# Patient Record
Sex: MALE | URBAN - METROPOLITAN AREA
[De-identification: names, ages, dates, MRNs, and addresses within clinical notes are randomized per-mention and may not be internally consistent; named-entity substitution may affect disease eponyms.]

---

## 2019-05-07 ENCOUNTER — TELEPHONE (OUTPATIENT)
Dept: GASTROENTEROLOGY | Facility: CLINIC | Age: 54
End: 2019-05-07

## 2019-05-07 ENCOUNTER — TELEPHONE (OUTPATIENT)
Dept: FAMILY MEDICINE | Facility: CLINIC | Age: 54
End: 2019-05-07

## 2019-05-07 ENCOUNTER — OFFICE VISIT (OUTPATIENT)
Dept: FAMILY MEDICINE | Facility: CLINIC | Age: 54
End: 2019-05-07
Payer: COMMERCIAL

## 2019-05-07 VITALS
BODY MASS INDEX: 36.29 KG/M2 | SYSTOLIC BLOOD PRESSURE: 127 MMHG | OXYGEN SATURATION: 96 % | HEIGHT: 69 IN | HEART RATE: 80 BPM | DIASTOLIC BLOOD PRESSURE: 83 MMHG | WEIGHT: 245 LBS | TEMPERATURE: 98.8 F

## 2019-05-07 DIAGNOSIS — Z11.59 NEED FOR HEPATITIS C SCREENING TEST: ICD-10-CM

## 2019-05-07 DIAGNOSIS — K40.90 UNILATERAL INGUINAL HERNIA WITHOUT OBSTRUCTION OR GANGRENE, RECURRENCE NOT SPECIFIED: ICD-10-CM

## 2019-05-07 DIAGNOSIS — R63.4 UNINTENDED WEIGHT LOSS: ICD-10-CM

## 2019-05-07 DIAGNOSIS — R68.81 EARLY SATIETY: ICD-10-CM

## 2019-05-07 DIAGNOSIS — Z11.4 SCREENING FOR HIV (HUMAN IMMUNODEFICIENCY VIRUS): ICD-10-CM

## 2019-05-07 DIAGNOSIS — K59.00 CONSTIPATION, UNSPECIFIED CONSTIPATION TYPE: ICD-10-CM

## 2019-05-07 DIAGNOSIS — Z12.11 SCREEN FOR COLON CANCER: ICD-10-CM

## 2019-05-07 DIAGNOSIS — K42.9 UMBILICAL HERNIA WITHOUT OBSTRUCTION AND WITHOUT GANGRENE: Primary | ICD-10-CM

## 2019-05-07 LAB
ERYTHROCYTE [DISTWIDTH] IN BLOOD BY AUTOMATED COUNT: 13.7 % (ref 10–15)
HCT VFR BLD AUTO: 48.1 % (ref 40–53)
HGB BLD-MCNC: 16.1 G/DL (ref 13.3–17.7)
MCH RBC QN AUTO: 32 PG (ref 26.5–33)
MCHC RBC AUTO-ENTMCNC: 33.5 G/DL (ref 31.5–36.5)
MCV RBC AUTO: 96 FL (ref 78–100)
PLATELET # BLD AUTO: 205 10E9/L (ref 150–450)
RBC # BLD AUTO: 5.03 10E12/L (ref 4.4–5.9)
WBC # BLD AUTO: 7.3 10E9/L (ref 4–11)

## 2019-05-07 PROCEDURE — 80053 COMPREHEN METABOLIC PANEL: CPT | Performed by: NURSE PRACTITIONER

## 2019-05-07 PROCEDURE — 86803 HEPATITIS C AB TEST: CPT | Performed by: NURSE PRACTITIONER

## 2019-05-07 PROCEDURE — 87389 HIV-1 AG W/HIV-1&-2 AB AG IA: CPT | Performed by: NURSE PRACTITIONER

## 2019-05-07 PROCEDURE — 99203 OFFICE O/P NEW LOW 30 MIN: CPT | Performed by: NURSE PRACTITIONER

## 2019-05-07 PROCEDURE — 36415 COLL VENOUS BLD VENIPUNCTURE: CPT | Performed by: NURSE PRACTITIONER

## 2019-05-07 PROCEDURE — 85027 COMPLETE CBC AUTOMATED: CPT | Performed by: NURSE PRACTITIONER

## 2019-05-07 RX ORDER — POLYETHYLENE GLYCOL 3350 17 G/17G
1 POWDER, FOR SOLUTION ORAL 2 TIMES DAILY PRN
Qty: 578 G | Refills: 0 | Status: SHIPPED | OUTPATIENT
Start: 2019-05-07 | End: 2019-08-01

## 2019-05-07 ASSESSMENT — PAIN SCALES - GENERAL: PAINLEVEL: MILD PAIN (3)

## 2019-05-07 ASSESSMENT — MIFFLIN-ST. JEOR: SCORE: 1896.69

## 2019-05-07 NOTE — LETTER
Mariela 3, 2019    Arian Riaz Jaime  [address] [address]    Dear Arian    We care about your health and have reviewed your health plan. We have reviewed your medical conditions, medication list, and lab results and are making recommendations based on this review, to better manage your health.    You are in particular need of attention regarding:  - Scheduling your Colonoscopy, after reviewing your chart you are due for a colonoscopy please call us at [phone] to help with scheduling if you need.      Here is a list of Health Maintenance topics that are due now or due soon:  Health Maintenance Due   Topic Date Due     PREVENTIVE CARE VISIT  1966     COLONOSCOPY  03/15/1976     DTAP/TDAP/TD IMMUNIZATION (1 - Tdap) 03/15/1991     LIPID  03/15/2001     ZOSTER IMMUNIZATION (1 of 2) 03/15/2016     We will be calling you in the next couple of weeks to help you schedule any appointments that are needed.  Please call us at [phone] (or use [employer]) to address the above recommendations.     Thank you for trusting [facility] and we appreciate the opportunity to serve you.  We look forward to supporting your healthcare needs in the future.    Healthy Regards,    Coco Coughlin, DARÍO/cm

## 2019-05-07 NOTE — LETTER
[facility]  [address] [place]  [address]  [phone]        May 9, 2019    Arian Jaime  [address] [address]        Dear Arian,    The results of your recent labs are enclosed.   Your blood work was within normal range. Glucose was slightly elevated but not a true fasting glucose. We can check an A1c at your follow up appointment to rule out diabetes though I think it is unlikely.   HIV and hepatitis C screening was negative.   Please call the clinic if you have any concerns.     Results for orders placed or performed in visit on 05/07/19   Hepatitis C Screen Reflex to HCV RNA Quant and Genotype   Result Value Ref Range    Hepatitis C Antibody Nonreactive NR^Nonreactive   HIV Screening   Result Value Ref Range    HIV Antigen Antibody Combo Nonreactive NR^Nonreactive       Comprehensive metabolic panel (BMP + Alb, Alk Phos, ALT, AST, Total. Bili, TP)   Result Value Ref Range    Sodium 141 133 - 144 mmol/L    Potassium 4.0 3.4 - 5.3 mmol/L    Chloride 109 94 - 109 mmol/L    Carbon Dioxide 25 20 - 32 mmol/L    Anion Gap 7 3 - 14 mmol/L    Glucose 118 (H) 70 - 99 mg/dL    Urea Nitrogen 14 7 - 30 mg/dL    Creatinine 0.69 0.66 - 1.25 mg/dL    GFR Estimate >90 >60 mL/min/[1.73_m2]    GFR Estimate If Black >90 >60 mL/min/[1.73_m2]    Calcium 9.0 8.5 - 10.1 mg/dL    Bilirubin Total 0.8 0.2 - 1.3 mg/dL    Albumin 4.3 3.4 - 5.0 g/dL    Protein Total 7.6 6.8 - 8.8 g/dL    Alkaline Phosphatase 73 40 - 150 U/L    ALT 20 0 - 70 U/L    AST 12 0 - 45 U/L   CBC with platelets   Result Value Ref Range    WBC 7.3 4.0 - 11.0 10e9/L    RBC Count 5.03 4.4 - 5.9 10e12/L    Hemoglobin 16.1 13.3 - 17.7 g/dL    Hematocrit 48.1 40.0 - 53.0 %    MCV 96 78 - 100 fl    MCH 32.0 26.5 - 33.0 pg    MCHC 33.5 31.5 - 36.5 g/dL    RDW 13.7 10.0 - 15.0 %    Platelet Count 205 150 - 450 10e9/L       If you have any questions please call the clinic at  [phone].    Sincerely,    Coco AMARO CNP  LMD

## 2019-05-07 NOTE — PROGRESS NOTES
"  SUBJECTIVE:   Arian Jaime is a 64 year old male who presents to clinic today for the following   health issues:      2 hernias  1 in belly button   Left inguinal really painful  Had for months but was doing some heavy lifting last month and feels like they're worse    16 pound weight loss over past month  Feels like he can't eat  Feels bloated and \"tight pressure\" in abdomen so he has been eating less  Some nausea  Denies vomiting  Constipation for 3 weeks  Taking laxatives (bisacodyl 10 mg) daily otherwise will have BM every 3-4 days  No black or bloody stools  Never had colonoscopy  Constipation is a new issue. Never had previously        Additional history: as documented    Reviewed  and updated as needed this visit by clinical staff  Tobacco  Allergies  Meds  Med Hx  Surg Hx  Fam Hx  Soc Hx        Reviewed and updated as needed this visit by Provider         There is no problem list on file for this patient.    History reviewed. No pertinent surgical history.    Social History     Tobacco Use     Smoking status: Current Some Day Smoker     Smokeless tobacco: Never Used   Substance Use Topics     Alcohol use: Yes     Comment: occ.     Family History   Problem Relation Age of Onset     No Known Problems Mother            ROS:  Constitutional, HEENT, cardiovascular, pulmonary, gi and gu systems are negative, except as otherwise noted.    OBJECTIVE:     /83 (BP Location: Right arm, Patient Position: Chair, Cuff Size: Adult Regular)   Pulse 80   Temp 98.8  F (37.1  C) (Oral)   Ht 1.753 m (5' 9\")   Wt 111.1 kg (245 lb)   SpO2 96%   BMI 36.18 kg/m    Body mass index is 36.18 kg/m .  GENERAL: healthy, alert and no distress  RESP: lungs clear to auscultation - no rales, rhonchi or wheezes  CV: regular rate and rhythm, normal S1 S2, no S3 or S4, no murmur, click or rub, no peripheral edema and peripheral pulses strong  ABDOMEN: soft, non tender, bowel sounds active  Umbilical hernia tender to " palpation, not protruding from umbilicus   (male): Large reproducible tender left inguinal hernia    Diagnostic Test Results:  none     ASSESSMENT/PLAN:       ICD-10-CM    1. Umbilical hernia without obstruction and without gangrene K42.9 GENERAL SURG ADULT REFERRAL     CT Abdomen Pelvis w Contrast   2. Unilateral inguinal hernia without obstruction or gangrene, recurrence not specified K40.90 GENERAL SURG ADULT REFERRAL     CT Abdomen Pelvis w Contrast   3. Unintended weight loss R63.4 GENERAL SURG ADULT REFERRAL     Comprehensive metabolic panel (BMP + Alb, Alk Phos, ALT, AST, Total. Bili, TP)     CBC with platelets     CT Abdomen Pelvis w Contrast   4. Screen for colon cancer Z12.11 GASTROENTEROLOGY ADULT REF PROCEDURE ONLY [facility] [phone]   5. Need for hepatitis C screening test Z11.59 Hepatitis C Screen Reflex to HCV RNA Quant and Genotype   6. Constipation, unspecified constipation type K59.00 polyethylene glycol (MIRALAX/GLYCOLAX) powder     CT Abdomen Pelvis w Contrast   7. Screening for HIV (human immunodeficiency virus) Z11.4 HIV Screening   8. Early satiety R68.81 CT Abdomen Pelvis w Contrast       Concerning to have 16 pound weight loss and acute change in bowel  Hernia does not appear to be incarcerated and he is able to evacuate his bowels with daily laxative  Our TC helped to schedule general surgery consult and unfortunately the soonest he can be seen is May 22  Will obtain CT abdomen and pelvis tomorrow given sudden unintended weight loss and constipation  Abdomen is soft and bowel sounds active. I do not believe he has a bowel obstruction  He is also scheduled for colonoscopy which he is overdue for screening purposes and it this time warrants given sudden change in bowel habits    More than 25 minutes spent with patient, more than 50% of which was spent on counseling and coordination of care.      PREM Lubin [facility]

## 2019-05-07 NOTE — LETTER
June 20, 2019    Arina Jaime  [address] [address]      Dear Arian Jaime,     We have tried to contact you about your health, but have been unable to reach you.  Please call us as soon as possible so we can provide you with the best care possible.  We will continue to check in with you throughout the year to complete these items of care, if you are not able to complete these items at this time.  If you would like to complete the missing items for your care, please contact us at [phone].    We recommend the following:  -schedule a COLONOSCOPY to look for colon cancer (due every 10 years or 5 years in higher risk situations.)   Colon cancer is now the second leading cause of death in the [place] for both men and women and there are over 130,000 new cases and 50,000 deaths per year from colon cancer.  Colonoscopies can prevent 90-95% of these deaths.  Problem lesions can be removed before they ever become cancer.  This test is not only looking for cancer, but also getting rid of precancerious lesions.  If you do not wish to do a colonoscopy or cannot afford to do one, at this time, there is another option. It is called a FIT test.        Sincerely,     Your Care Team at [facility]

## 2019-05-07 NOTE — LETTER
[address] [address]  Phone: [phone]  Fax: [phone]    May 7, 2019        Arian Jaime  [address] [address]          To whom it may concern:    RE: Arian Jaime    Patient was seen and treated today at our clinic. Please excuse his absence from work today. He will need to miss work on the following days for further medical care:  5/8/19, 5/21/19, 5/23/19      Please contact me for questions or concerns.      Sincerely,        PREM Lubin CNP

## 2019-05-07 NOTE — TELEPHONE ENCOUNTER
Referring Provider: Coco Coughlin APRN CNP    BMI-36.18    Are you on daily home oxygen? n    Have you had a heart or lung transplant? n      In the past year, have you had any heart related issues  Including cardiomyopathy or a MI within 6 months, that required cardiac stenting or other implantable devices? n    What type of implantable device do you have? n    Do you take nitroglycerin? If yes, how often? n    Are you currently taking any blood thinners?n      (Females) Are you currently pregnant? n  If yes, how many weeks?      Have you had a procedure in the past that was difficult to tolerate with conscious sedation? Any allergies to Fentanyl or Versed n        Do you currently use any of the following?    Are you taking any scheduled prescription narcotics more than once daily? n    Drink alcohol daily? n    Currently using any street drugs or methadone?n    Do you have any history of post-traumatic stress syndrome or mental health issues? n

## 2019-05-08 ENCOUNTER — ANCILLARY PROCEDURE (OUTPATIENT)
Dept: CT IMAGING | Facility: CLINIC | Age: 54
End: 2019-05-08
Attending: NURSE PRACTITIONER
Payer: COMMERCIAL

## 2019-05-08 DIAGNOSIS — R63.4 UNINTENDED WEIGHT LOSS: ICD-10-CM

## 2019-05-08 DIAGNOSIS — R68.81 EARLY SATIETY: ICD-10-CM

## 2019-05-08 DIAGNOSIS — K40.90 UNILATERAL INGUINAL HERNIA WITHOUT OBSTRUCTION OR GANGRENE, RECURRENCE NOT SPECIFIED: ICD-10-CM

## 2019-05-08 DIAGNOSIS — K42.9 UMBILICAL HERNIA WITHOUT OBSTRUCTION AND WITHOUT GANGRENE: ICD-10-CM

## 2019-05-08 DIAGNOSIS — K59.00 CONSTIPATION, UNSPECIFIED CONSTIPATION TYPE: ICD-10-CM

## 2019-05-08 LAB
ALBUMIN SERPL-MCNC: 4.3 G/DL (ref 3.4–5)
ALP SERPL-CCNC: 73 U/L (ref 40–150)
ALT SERPL W P-5'-P-CCNC: 20 U/L (ref 0–70)
ANION GAP SERPL CALCULATED.3IONS-SCNC: 7 MMOL/L (ref 3–14)
AST SERPL W P-5'-P-CCNC: 12 U/L (ref 0–45)
BILIRUB SERPL-MCNC: 0.8 MG/DL (ref 0.2–1.3)
BUN SERPL-MCNC: 14 MG/DL (ref 7–30)
CALCIUM SERPL-MCNC: 9 MG/DL (ref 8.5–10.1)
CHLORIDE SERPL-SCNC: 109 MMOL/L (ref 94–109)
CO2 SERPL-SCNC: 25 MMOL/L (ref 20–32)
CREAT SERPL-MCNC: 0.69 MG/DL (ref 0.66–1.25)
GFR SERPL CREATININE-BSD FRML MDRD: >90 ML/MIN/{1.73_M2}
GLUCOSE SERPL-MCNC: 118 MG/DL (ref 70–99)
HCV AB SERPL QL IA: NONREACTIVE
HIV 1+2 AB+HIV1 P24 AG SERPL QL IA: NONREACTIVE
POTASSIUM SERPL-SCNC: 4 MMOL/L (ref 3.4–5.3)
PROT SERPL-MCNC: 7.6 G/DL (ref 6.8–8.8)
SODIUM SERPL-SCNC: 141 MMOL/L (ref 133–144)

## 2019-05-08 RX ORDER — IOPAMIDOL 755 MG/ML
135 INJECTION, SOLUTION INTRAVASCULAR ONCE
Status: COMPLETED | OUTPATIENT
Start: 2019-05-08 | End: 2019-05-08

## 2019-05-08 RX ADMIN — IOPAMIDOL 135 ML: 755 INJECTION, SOLUTION INTRAVASCULAR at 15:07

## 2019-05-08 NOTE — DISCHARGE INSTRUCTIONS

## 2019-05-09 ENCOUNTER — TELEPHONE (OUTPATIENT)
Dept: FAMILY MEDICINE | Facility: CLINIC | Age: 54
End: 2019-05-09

## 2019-05-09 NOTE — RESULT ENCOUNTER NOTE
[address] [address]  Phone: [phone]  Fax: [phone]      05/09/19    Arian Jaime  [address] [address]      Dear Arian,    The results of your recent labs are enclosed.  Your blood work was within normal range. Glucose was slightly elevated but not a true fasting glucose. We can check an A1c at your follow up appointment to rule out diabetes though I think it is unlikely.   HIV and hepatitis C screening was negative.   Please call the clinic if you have any concerns.    Sincerely,    PREM Lubin CNP    Your [facility] Care Team

## 2019-05-09 NOTE — RESULT ENCOUNTER NOTE
Please let him know that CT did not show any concerning findings that would explain his recent weight loss and constipation. Please follow through with planned appointments with general surgery and colonoscopy. Follow up with me 1-2 weeks after colonoscopy to reassess symptoms and review results of colonoscopy.  CT did show a small adrenal mass which is likely benign. We will discuss that further at his follow up appointment and whether we want to do additional workup regarding adrenal mass

## 2019-05-09 NOTE — TELEPHONE ENCOUNTER
Received the following message from Coco AMARO CNP:    Please let him know that CT did not show any concerning findings that would explain his recent weight loss and constipation. Please follow through with planned appointments with general surgery and colonoscopy. Follow up with me 1-2 weeks after colonoscopy to reassess symptoms and review results of colonoscopy.  CT did show a small adrenal mass which is likely benign. We will discuss that further at his follow up appointment and whether we want to do additional workup regarding adrenal mass

## 2019-05-09 NOTE — TELEPHONE ENCOUNTER
I called patient and advised him of Coco's result note and plan.  Scheduled him for follow up one week after planned colonoscopy.    Patient verbalized understanding of and agreement with plan.    Isabella Lewis RN  [facility]

## 2019-05-16 ENCOUNTER — TELEPHONE (OUTPATIENT)
Dept: GASTROENTEROLOGY | Facility: OUTPATIENT CENTER | Age: 54
End: 2019-05-16

## 2019-06-03 NOTE — TELEPHONE ENCOUNTER
Called and left  for patient to call back regarding items due in HM.  LU Sebastian MA      Quality letter mailed to patient as a reminder of  items due.  LU Sebastian MA

## 2019-06-06 ENCOUNTER — TELEPHONE (OUTPATIENT)
Dept: FAMILY MEDICINE | Facility: CLINIC | Age: 54
End: 2019-06-06

## 2019-06-06 DIAGNOSIS — R63.4 WEIGHT LOSS: ICD-10-CM

## 2019-06-06 DIAGNOSIS — E27.8 ADRENAL MASS (H): Primary | ICD-10-CM

## 2019-06-06 NOTE — TELEPHONE ENCOUNTER
I spoke with patient  He reports bowel issues have resolved  He is not sure if he is losing weight still  Advised to schedule lab only appointment (see labs ordered)  Also recommend endocrinology consult to determine is mass if functioning or not. Referral placed and patient elects to call to schedule    He was at work when I spoke with him so I wrote letter detailing our conversation. Please mail

## 2019-06-06 NOTE — LETTER
[address] [address]  Phone: [phone]  Fax: [phone]    June 6, 2019        Arian Jaime  [address] [address]          Arian,    As we spoke about on the phone, I ordered some blood work to further assess the mass on your adrenal gland. We need to identify if this is a non-functioning or functioning mass. Please schedule a lab only appointment. This should be a morning lab so you can schedule it before you go to work. Please call our clinic to schedule.    Additionally, I would like for you to see an endocrinologist to follow up on this mass. If it is a functioning tumor, it needs to be identified and treated appropriately. Most masses are non-functioning but you will need further evaluation to be sure. Below are several clinic options, please call to schedule an appointment with an endocrinologist.     [facility]  [phone]    [facility] [phone]    [facility] [phone]     Endocrinology Clinic [facility] [phone]      Please contact me for questions or concerns.      Sincerely,        PREM Lubin CNP

## 2019-06-06 NOTE — TELEPHONE ENCOUNTER
I see patient cancelled his colonoscopy and follow up with me. Can you call and see if he can reschedule? I am concerned about his weight loss and change in bowel habits. His CT showed an adrenal mass which often ends of being benign but I would recommend some further testing. Please let me know what works for him

## 2019-06-06 NOTE — TELEPHONE ENCOUNTER
Attempt # 1  Called patient at home number.[phone]  Was call answered?  Yes, broke 4 ribs, fell, tripped a couple weeks ago, states was seen in the ER for this, cancelled colonoscopy and f/u due to the broken ribs. Nurse relayed the message from Coco, patient states cannot take time off work for awhile because he was just off for 2 weeks for the broken ribs, nurse advised patient to schedule the colonoscopy as soon as possible with a f/u with Coco one week later, Patient verbalized understanding and agreement with plan. States as soon as he has some time off built up will schedule appointments.         Ashley Marrero RN  [facility]

## 2019-06-20 NOTE — TELEPHONE ENCOUNTER
Called and left  for patient to call back regarding items due in HM.  LU Sebastian MA      Final quality letter mailed to patient as a reminder of  items due.  LU Sebastian MA

## 2019-08-01 ENCOUNTER — OFFICE VISIT (OUTPATIENT)
Dept: FAMILY MEDICINE | Facility: CLINIC | Age: 54
End: 2019-08-01
Payer: COMMERCIAL

## 2019-08-01 VITALS
SYSTOLIC BLOOD PRESSURE: 145 MMHG | HEART RATE: 92 BPM | OXYGEN SATURATION: 97 % | BODY MASS INDEX: 37.07 KG/M2 | WEIGHT: 251 LBS | TEMPERATURE: 98.5 F | DIASTOLIC BLOOD PRESSURE: 91 MMHG

## 2019-08-01 DIAGNOSIS — Z13.6 SCREENING FOR HEART DISEASE: ICD-10-CM

## 2019-08-01 DIAGNOSIS — Z13.6 SCREENING FOR HEART DISEASE: Primary | ICD-10-CM

## 2019-08-01 DIAGNOSIS — G58.8 OTHER MONONEUROPATHY: Primary | ICD-10-CM

## 2019-08-01 PROBLEM — F10.10 ALCOHOL CONSUMPTION BINGE DRINKING: Status: ACTIVE | Noted: 2019-05-22

## 2019-08-01 PROBLEM — S22.41XA MULTIPLE CLOSED FRACTURES OF RIBS OF RIGHT SIDE: Status: ACTIVE | Noted: 2019-08-01

## 2019-08-01 PROBLEM — R03.0 ELEVATED BLOOD PRESSURE, SITUATIONAL: Status: ACTIVE | Noted: 2019-05-22

## 2019-08-01 LAB
ALBUMIN SERPL-MCNC: 4 G/DL (ref 3.4–5)
ALP SERPL-CCNC: 93 U/L (ref 40–150)
ALT SERPL W P-5'-P-CCNC: 23 U/L (ref 0–70)
ANION GAP SERPL CALCULATED.3IONS-SCNC: 5 MMOL/L (ref 3–14)
AST SERPL W P-5'-P-CCNC: 15 U/L (ref 0–45)
BASOPHILS # BLD AUTO: 0 10E9/L (ref 0–0.2)
BASOPHILS NFR BLD AUTO: 0.1 %
BILIRUB SERPL-MCNC: 0.4 MG/DL (ref 0.2–1.3)
BUN SERPL-MCNC: 19 MG/DL (ref 7–30)
CALCIUM SERPL-MCNC: 8.9 MG/DL (ref 8.5–10.1)
CHLORIDE SERPL-SCNC: 110 MMOL/L (ref 94–109)
CO2 SERPL-SCNC: 25 MMOL/L (ref 20–32)
CREAT SERPL-MCNC: 0.55 MG/DL (ref 0.66–1.25)
DIFFERENTIAL METHOD BLD: NORMAL
EOSINOPHIL # BLD AUTO: 0.2 10E9/L (ref 0–0.7)
EOSINOPHIL NFR BLD AUTO: 2.9 %
ERYTHROCYTE [DISTWIDTH] IN BLOOD BY AUTOMATED COUNT: 14.5 % (ref 10–15)
GFR SERPL CREATININE-BSD FRML MDRD: >90 ML/MIN/{1.73_M2}
GLUCOSE SERPL-MCNC: 122 MG/DL (ref 70–99)
HCT VFR BLD AUTO: 45.8 % (ref 40–53)
HGB BLD-MCNC: 14.9 G/DL (ref 13.3–17.7)
LYMPHOCYTES # BLD AUTO: 1 10E9/L (ref 0.8–5.3)
LYMPHOCYTES NFR BLD AUTO: 13.6 %
MCH RBC QN AUTO: 31.4 PG (ref 26.5–33)
MCHC RBC AUTO-ENTMCNC: 32.5 G/DL (ref 31.5–36.5)
MCV RBC AUTO: 96 FL (ref 78–100)
MONOCYTES # BLD AUTO: 0.8 10E9/L (ref 0–1.3)
MONOCYTES NFR BLD AUTO: 11.1 %
NEUTROPHILS # BLD AUTO: 5.2 10E9/L (ref 1.6–8.3)
NEUTROPHILS NFR BLD AUTO: 72.3 %
PLATELET # BLD AUTO: 227 10E9/L (ref 150–450)
POTASSIUM SERPL-SCNC: 4.1 MMOL/L (ref 3.4–5.3)
PROT SERPL-MCNC: 7.3 G/DL (ref 6.8–8.8)
RBC # BLD AUTO: 4.75 10E12/L (ref 4.4–5.9)
SODIUM SERPL-SCNC: 140 MMOL/L (ref 133–144)
TSH SERPL DL<=0.005 MIU/L-ACNC: 0.59 MU/L (ref 0.4–4)
VIT B12 SERPL-MCNC: 302 PG/ML (ref 193–986)
WBC # BLD AUTO: 7.2 10E9/L (ref 4–11)

## 2019-08-01 PROCEDURE — 84443 ASSAY THYROID STIM HORMONE: CPT | Performed by: FAMILY MEDICINE

## 2019-08-01 PROCEDURE — 00000402 ZZHCL STATISTIC TOTAL PROTEIN: Performed by: FAMILY MEDICINE

## 2019-08-01 PROCEDURE — 82607 VITAMIN B-12: CPT | Performed by: FAMILY MEDICINE

## 2019-08-01 PROCEDURE — 99214 OFFICE O/P EST MOD 30 MIN: CPT | Performed by: FAMILY MEDICINE

## 2019-08-01 PROCEDURE — 36415 COLL VENOUS BLD VENIPUNCTURE: CPT | Performed by: FAMILY MEDICINE

## 2019-08-01 PROCEDURE — 85025 COMPLETE CBC W/AUTO DIFF WBC: CPT | Performed by: FAMILY MEDICINE

## 2019-08-01 PROCEDURE — 80053 COMPREHEN METABOLIC PANEL: CPT | Performed by: FAMILY MEDICINE

## 2019-08-01 PROCEDURE — 84165 PROTEIN E-PHORESIS SERUM: CPT | Performed by: FAMILY MEDICINE

## 2019-08-01 SDOH — HEALTH STABILITY: MENTAL HEALTH: HOW MANY STANDARD DRINKS CONTAINING ALCOHOL DO YOU HAVE ON A TYPICAL DAY?: 5 OR 6

## 2019-08-01 SDOH — HEALTH STABILITY: MENTAL HEALTH: HOW OFTEN DO YOU HAVE A DRINK CONTAINING ALCOHOL?: 4 OR MORE TIMES A WEEK

## 2019-08-01 ASSESSMENT — PAIN SCALES - GENERAL: PAINLEVEL: NO PAIN (0)

## 2019-08-01 NOTE — PROGRESS NOTES
Subjective     Arian Jaime is a 54 year old male who presents to clinic today for the following health issues:    HPI   Edema      Duration: Ongoing for a few years, worse within the last few weeks    Description (location/character/radiation): Swollen, aching, little bumps on the back of heels    Intensity:  severe    Accompanying signs and symptoms: Numbness    History (similar episodes/previous evaluation): None    Precipitating or alleviating factors: Being on feet makes it worse, walks all day at work    Therapies tried and outcome: Lorriee, no relief     Liseth Fabian MA    Patient is here today concerned about some symptoms that have been gradually developing over the last couple of years.  He notes some pain in the arch of the foot as well as the lateral aspect of the foot.  There is some associated numbness and tingling in these areas and now it is starting to radiate up the lateral calf and may be a little bit above the knee into the lateral thigh.  It seems to be more of paresthesia type feeling.  This is primarily on the right side and is concerned about some swelling in the ankles.  He works as a  so is on his feet all day and that tends to make things worse.  He has not seen a doctor for this previously.  He feels that there is some swelling in the ankle joints.  He denies any shortness of breath or PND.  No known history of diabetes.  He does consume alcohol on a regular basis.  Bowel and bladder function is normal.        Patient Active Problem List   Diagnosis     Alcohol consumption binge drinking     Diverticulitis of colon     Elevated blood pressure, situational     Multiple closed fractures of ribs of right side     Past Surgical History:   Procedure Laterality Date     COLECTOMY PARTIAL  2009    sigmoid colectomy due to acute diverticulitis without rupture     TONSILLECTOMY         Social History     Tobacco Use     Smoking status: Current Some Day Smoker     Smokeless  tobacco: Never Used     Tobacco comment: social smoker   Substance Use Topics     Alcohol use: Yes     Frequency: 4 or more times a week     Drinks per session: 5 or 6     Comment: occ.     Family History   Problem Relation Age of Onset     Other - See Comments Mother         unknown, parents  when pt was infant     Kidney Cancer Father         cause of death     Other - See Comments Son         no knowledge, gave up for adoption         No current outpatient medications on file.     No Known Allergies    Reviewed and updated as needed this visit by Provider  Tobacco  Meds  Problems  Med Hx  Surg Hx  Fam Hx  Soc Hx        Review of Systems   ROS COMP: Constitutional, HEENT, cardiovascular, pulmonary, gi and gu systems are negative, except as otherwise noted.      Objective    BP (!) 145/91 (BP Location: Left arm, Patient Position: Chair, Cuff Size: Adult Large)   Pulse 92   Temp 98.5  F (36.9  C) (Oral)   Wt 113.9 kg (251 lb)   SpO2 97%   BMI 37.07 kg/m    Body mass index is 37.07 kg/m .  Physical Exam   GENERAL: healthy, alert and no distress  EYES: Eyes grossly normal to inspection, PERRL and conjunctivae and sclerae normal  NECK: no adenopathy, no asymmetry, masses, or scars and thyroid normal to palpation  RESP: lungs clear to auscultation - no rales, rhonchi or wheezes  CV: regular rate and rhythm, normal S1 S2, no S3 or S4, no murmur, click or rub, no peripheral edema and peripheral pulses strong  ABDOMEN: soft, nontender, no hepatosplenomegaly, no masses and bowel sounds normal  MS: no gross musculoskeletal defects noted, no edema  MS: There is no pitting edema.  He may have slight effusions to the ankles bilaterally.  Rather significant bilateral pes planus is noted and also a little bit of bowlegged deformity at the knee and arthritic changes as well.  SKIN: no suspicious lesions or rashes  NEURO: Normal strength and tone, mentation intact and speech normal  NEURO: DTR's normal and  "symmetric 2+  PSYCH: mentation appears normal, affect normal/bright    Diagnostic Test Results:  Labs reviewed in Epic        Assessment & Plan     1. Other mononeuropathy  He appears to have some type of neuropathy.  Its present greater on the right than the left and I wonder if it might be some type of compressive neuropathy related to his gait.  I have asked him to see podiatry because he has very significant bilateral flat feet.  Laboratory studies were checked to evaluate for possible metabolic cause.  If these initial steps or not helpful may consider referral to neurology or imaging of his lower back.  - CBC with platelets and differential  - Comprehensive metabolic panel (BMP + Alb, Alk Phos, ALT, AST, Total. Bili, TP)  - TSH with free T4 reflex  - Vitamin B12  - Protein electrophoresis  - PODIATRY/FOOT & ANKLE SURGERY REFERRAL    2. Screening for heart disease  Not fasting today.       Tobacco Cessation:   reports that he has been smoking.  He has never used smokeless tobacco.  Tobacco Cessation Action Plan: Information offered: Patient not interested at this time      BMI:   Estimated body mass index is 37.07 kg/m  as calculated from the following:    Height as of 5/7/19: 1.753 m (5' 9\").    Weight as of this encounter: 113.9 kg (251 lb).   Weight management plan: Discussed healthy diet and exercise guidelines            Return in about 4 weeks (around 8/29/2019) for Routine Visit if not improving.    Raymon Conner MD  [facility]    "

## 2019-08-01 NOTE — LETTER
[facility]   [address] [place]  [address]  [phone]                                   August 12, 2019    Arian Jaime  [address] [place] [address]        Dear Arian,    Test results all look good except for the mild elevation in the blood sugar.  This was present when last checked about 3 months ago.  See the foot specialist and let me know if that does not help with the symptoms.    Results for orders placed or performed in visit on 08/01/19   CBC with platelets and differential   Result Value Ref Range    WBC 7.2 4.0 - 11.0 10e9/L    RBC Count 4.75 4.4 - 5.9 10e12/L    Hemoglobin 14.9 13.3 - 17.7 g/dL    Hematocrit 45.8 40.0 - 53.0 %    MCV 96 78 - 100 fl    MCH 31.4 26.5 - 33.0 pg    MCHC 32.5 31.5 - 36.5 g/dL    RDW 14.5 10.0 - 15.0 %    Platelet Count 227 150 - 450 10e9/L    % Neutrophils 72.3 %    % Lymphocytes 13.6 %    % Monocytes 11.1 %    % Eosinophils 2.9 %    % Basophils 0.1 %    Absolute Neutrophil 5.2 1.6 - 8.3 10e9/L    Absolute Lymphocytes 1.0 0.8 - 5.3 10e9/L    Absolute Monocytes 0.8 0.0 - 1.3 10e9/L    Absolute Eosinophils 0.2 0.0 - 0.7 10e9/L    Absolute Basophils 0.0 0.0 - 0.2 10e9/L    Diff Method Automated Method    Comprehensive metabolic panel (BMP + Alb, Alk Phos, ALT, AST, Total. Bili, TP)   Result Value Ref Range    Sodium 140 133 - 144 mmol/L    Potassium 4.1 3.4 - 5.3 mmol/L    Chloride 110 (H) 94 - 109 mmol/L    Carbon Dioxide 25 20 - 32 mmol/L    Anion Gap 5 3 - 14 mmol/L    Glucose 122 (H) 70 - 99 mg/dL    Urea Nitrogen 19 7 - 30 mg/dL    Creatinine 0.55 (L) 0.66 - 1.25 mg/dL    GFR Estimate >90 >60 mL/min/[1.73_m2]    GFR Estimate If Black >90 >60 mL/min/[1.73_m2]    Calcium 8.9 8.5 - 10.1 mg/dL    Bilirubin Total 0.4 0.2 - 1.3 mg/dL    Albumin 4.0 3.4 - 5.0 g/dL    Protein Total 7.3 6.8 - 8.8 g/dL    Alkaline Phosphatase 93 40 - 150 U/L    ALT 23 0 - 70 U/L    AST 15 0 - 45 U/L   TSH with free T4 reflex   Result  Value Ref Range    TSH 0.59 0.40 - 4.00 mU/L   Vitamin B12   Result Value Ref Range    Vitamin B12 302 193 - 986 pg/mL   Protein electrophoresis   Result Value Ref Range    Albumin Fraction 4.4 3.7 - 5.1 g/dL    Alpha 1 Fraction 0.3 0.2 - 0.4 g/dL    Alpha 2 Fraction 0.6 0.5 - 0.9 g/dL    Beta Fraction 0.9 0.6 - 1.0 g/dL    Gamma Fraction 0.9 0.7 - 1.6 g/dL    Monoclonal Peak 0.0 0.0 g/dL    ELP Interpretation:       Essentially normal electrophoretic pattern.  No monoclonal protein seen. Pathologic   significance requires clinical correlation.  Chiki Zepeda M.D., Ph.D., Pathologist.            If you have any questions please call the clinic at [phone]    Sincerely,    Raymon Conner MD  bmd

## 2019-08-02 LAB
ALBUMIN SERPL ELPH-MCNC: 4.4 G/DL (ref 3.7–5.1)
ALPHA1 GLOB SERPL ELPH-MCNC: 0.3 G/DL (ref 0.2–0.4)
ALPHA2 GLOB SERPL ELPH-MCNC: 0.6 G/DL (ref 0.5–0.9)
B-GLOBULIN SERPL ELPH-MCNC: 0.9 G/DL (ref 0.6–1)
GAMMA GLOB SERPL ELPH-MCNC: 0.9 G/DL (ref 0.7–1.6)
M PROTEIN SERPL ELPH-MCNC: 0 G/DL
PROT PATTERN SERPL ELPH-IMP: NORMAL

## 2019-08-16 ENCOUNTER — OFFICE VISIT (OUTPATIENT)
Dept: PODIATRY | Facility: CLINIC | Age: 54
End: 2019-08-16
Payer: COMMERCIAL

## 2019-08-16 ENCOUNTER — ANCILLARY PROCEDURE (OUTPATIENT)
Dept: GENERAL RADIOLOGY | Facility: CLINIC | Age: 54
End: 2019-08-16
Attending: PODIATRIST
Payer: COMMERCIAL

## 2019-08-16 VITALS
SYSTOLIC BLOOD PRESSURE: 141 MMHG | HEART RATE: 87 BPM | TEMPERATURE: 98.1 F | OXYGEN SATURATION: 97 % | DIASTOLIC BLOOD PRESSURE: 92 MMHG

## 2019-08-16 DIAGNOSIS — M79.671 PAIN IN BOTH FEET: ICD-10-CM

## 2019-08-16 DIAGNOSIS — M79.672 PAIN IN BOTH FEET: ICD-10-CM

## 2019-08-16 DIAGNOSIS — M79.671 PAIN IN BOTH FEET: Primary | ICD-10-CM

## 2019-08-16 DIAGNOSIS — R20.0 NUMBNESS IN FEET: ICD-10-CM

## 2019-08-16 DIAGNOSIS — M79.672 PAIN IN BOTH FEET: Primary | ICD-10-CM

## 2019-08-16 DIAGNOSIS — M21.6X1 PRONATION OF BOTH FEET: ICD-10-CM

## 2019-08-16 DIAGNOSIS — M21.6X2 PRONATION OF BOTH FEET: ICD-10-CM

## 2019-08-16 LAB
ERYTHROCYTE [SEDIMENTATION RATE] IN BLOOD BY WESTERGREN METHOD: 5 MM/H (ref 0–20)
URATE SERPL-MCNC: 4.5 MG/DL (ref 3.5–7.2)

## 2019-08-16 PROCEDURE — 99203 OFFICE O/P NEW LOW 30 MIN: CPT | Performed by: PODIATRIST

## 2019-08-16 PROCEDURE — 84550 ASSAY OF BLOOD/URIC ACID: CPT | Performed by: PODIATRIST

## 2019-08-16 PROCEDURE — 85652 RBC SED RATE AUTOMATED: CPT | Performed by: PODIATRIST

## 2019-08-16 PROCEDURE — 73630 X-RAY EXAM OF FOOT: CPT | Mod: RT

## 2019-08-16 PROCEDURE — 86038 ANTINUCLEAR ANTIBODIES: CPT | Performed by: PODIATRIST

## 2019-08-16 PROCEDURE — 86618 LYME DISEASE ANTIBODY: CPT | Performed by: PODIATRIST

## 2019-08-16 PROCEDURE — 86431 RHEUMATOID FACTOR QUANT: CPT | Performed by: PODIATRIST

## 2019-08-16 PROCEDURE — 73610 X-RAY EXAM OF ANKLE: CPT | Mod: LT

## 2019-08-16 PROCEDURE — 73630 X-RAY EXAM OF FOOT: CPT | Mod: LT

## 2019-08-16 PROCEDURE — 36415 COLL VENOUS BLD VENIPUNCTURE: CPT | Performed by: PODIATRIST

## 2019-08-16 NOTE — PROGRESS NOTES
S: Patient seen today in consult from Dr. Conner for left foot pain.   Points to sinus tarsi and cuboidleft of left foot.  Has had this for a year but is getting worse over the last month.  He works as a .  His pain is aggravated by activity and relieved by rest.  He also has numbness on both feet.  The right is much worse.  He states he goes up his right leg laterally.  He also says he has some pain in his hip and he points area of the sciatic nerve.  This is his biggest complaint in his right ankle.  He does consume alcohol on a regular basis.    ROS:  A 10-point review of systems was performed and is positive for that noted in the HPI and as seen above.  All other areas are negative.        No Known Allergies    No current outpatient medications on file.       Patient Active Problem List   Diagnosis     Alcohol consumption binge drinking     Diverticulitis of colon     Elevated blood pressure, situational     Multiple closed fractures of ribs of right side       Past Medical History:   Diagnosis Date     Multiple rib fractures     x 4 on right side, hospitalized over night       Past Surgical History:   Procedure Laterality Date     COLECTOMY PARTIAL  2009    sigmoid colectomy due to acute diverticulitis without rupture     TONSILLECTOMY         Family History   Problem Relation Age of Onset     Other - See Comments Mother         unknown, parents  when pt was infant     Kidney Cancer Father         cause of death     Other - See Comments Son         no knowledge, gave up for adoption       Social History     Tobacco Use     Smoking status: Current Some Day Smoker     Smokeless tobacco: Never Used     Tobacco comment: social smoker   Substance Use Topics     Alcohol use: Yes     Frequency: 4 or more times a week     Drinks per session: 5 or 6     Comment: occ.         Exam:    Vitals: BP (!) 141/92 (BP Location: Right arm, Patient Position: Sitting, Cuff Size: Adult Large)   Pulse 87   Temp  98.1  F (36.7  C) (Oral)   SpO2 97%   BMI: There is no height or weight on file to calculate BMI.  Height: Data Unavailable    Constitutional/ general:  Pt is in no apparent distress, appears well-nourished.  Cooperative with history and physical exam.     Psych:  The patient answered questions appropriately.  Normal affect.  Seems to have reasonable expectations, in terms of treatment.     Eyes:  Visual scanning/ tracking without deficit.     Ears:  Response to auditory stimuli is normal.  negative hearing aid devices.  Auricles in proper alignment.     Lymphatic:  Popliteal lymph nodes not enlarged.     Lungs:  Non labored breathing, non labored speech. No cough.  No audible wheezing. Even, quiet breathing.       Vascular:  positive pedal pulses bilaterally for both the DP and PT arteries.  CFT < 3 sec.  negative ankle edema.  positive pedal hair growth.    Neuro:  Alert and oriented x 3. Coordinated gait.  Light touch sensation is intact to the L4, L5, S1 distributions, but patient feels numbness with light touch more so on the right foot especially lateral.  No ankle clonus.  Babinski normal.  No evidence of neurological-based weakness, spasticity, or contracture in the lower extremities.      Derm: Normal texture and turgor.  No erythema, ecchymosis, or cyanosis.      Musculoskeletal:    Lower extremity muscle strength is normal.  Patient is ambulatory without an assistive device or brace.   Pronated arch with weightbearing.  No forefoot or rear foot deformities noted.  Normal ROM all fore foot and rearfoot joints.  No equinus.    No pain with stressing any muscle compartments.  Left lateral foot and ankle there is significant edema here.  There is pain from the lateral tarsometatarsal joint to the sinus tarsi.  There is slight erythema here.  There is no crepitus.  I can feel no obvious masses.  No calcaneal compression pain.    Radiographic Exam:  X-Ray Findings: Right x-rays unremarkable.  Left foot x-rays  show bossing on the dorsum of the talonavicular joint but no appreciable arthritis here.  No gross ankle arthritis or any other bone findings to explain all the swelling.    Vitamin B12 302   193 - 986 pg/mL Final 08/01/2019  2:36 PM 51     Glucose  122    A:    Left lateral ankle pain and edema  Bilateral pronation  Bilateral numbness with the right worse than the left    P:  X-rays taken today both feet and left ankle explained to patient that I see no obvious musculoskeletal cause of the swelling and pain in his left lateral ankle.  We will send to the lab today to rule out inflammatory arthritis and will call patient with results.  We also discussed additional imaging to evaluate for stress reaction or stress fracture.  It is possible he could have a severe case of sinus tarsi syndrome however this would be quite unusual.  I reviewed patient's recent labs.  explained that right numbness could be from radiculopathy especially with his pain in his hips.   Patient's alcohol abuse could also be contributing to his nerve pathology.  For further evaluation of this we discussed an EMG and referral to her neurologist.  He is in agreement with this.  We placed a referral and he will set up a time to have this done..  We will call the patient with the lab results and decide and how to proceed for his left foot.  Thank you for allowing me participate in the care of this patient.        Sylvester Silveira DPM, FACFAS

## 2019-08-16 NOTE — PATIENT INSTRUCTIONS
SMOKING CESSATION  What's in cigarette smoke? - Cigarette smoke contains over 4,000 chemicals. Nicotine is one of the main ingredients which is an insecticide/herbicide. It is poisonous to our nervous system, increases blood clotting risk, and decreases the body's defenses to fight off infection. Another chemical is Carbon Monoxide is an asphyxiating gas that permanently binds to blood cells and blocks the transport of oxygen. This leads to tissue death and decreases your metabolism. Tar is a chemical that coats your lungs and trachea which impairs new oxygen coming in and carbon dioxide getting out of your body.   How does smoking impact surgery? - Smoking is particularly hazardous with regards to surgery. Surgery puts stress on the body and a smoker's body is already under strain from these chemicals. Putting the two together, especially for an elective surgery, could be a recipe for disaster. Smoking before and after surgery increases your risk of heart problems, slow wound healing, delayed bone healing, blood clots, wound infection and anesthesia complications.   What are the benefits of quitting? - In 20 minutes your blood pressure will drop back down to normal. In 8 hours the carbon monoxide (a toxic gas) levels in your blood stream will drop by half, and oxygen levels will return to normal. In 48 hours your chance of having a heart attack will have decreased. All nicotine will have left your body. Your sense of taste and smell will return to a normal level. In 72 hours your bronchial tubes will relax, and your energy levels will increase. In 2 weeks your circulation will increase, and it will continue to improve for the next 10 weeks.    Recommendations for elective surgery - Ideally, patients should quit smoking 8 weeks before and at least 2 weeks after elective surgery in order to avoid complications. Simply cutting back on the amount of cigarettes smoked per day does not offer any benefit or decrease the  risk of poor wound healing, heart problems, and infection. Smokers should also start taking Vitamin C and B for two weeks before surgery and two weeks after surgery.    Ways to Stop Smokin. Nicotine patches, lozenges, or gum  2. Support Groups  3. Medications (see below)    List of Medications:  1. Varenicline Tartrate (CHANTIX)   2. Bupropion HCL (WELLBUTRIN, ZYBAN) - note: make sure Wellbutrin is for smoking cessation and not other issues   3. Nicotine Patch (NICODERM)   4. Nicotine Inhaler (NICOTROL)   5. Nicotine Gum Nicotine Polacrilex   6. Nicotine Lozenge: Nicotine Polacrilex (COMMIT)   * [facility] offers a smoking support group as well!  Please visit: [email or web address]  If you are interested in these, ask about getting a prescription or talk to your primary care doctor about what may be the best way for you to quit.       Weight management plan: Patient was referred to their PCP to discuss a diet and exercise plan.     Arian to follow up with Primary Care provider regarding elevated blood pressure.

## 2019-08-16 NOTE — LETTER
8/16/2019         RE: Arian Jaime  [address] [address]        Dear Colleague,    Thank you for referring your patient, Arian Jaime, to the [facility]. Please see a copy of my visit note below.    S: Patient seen today in consult from Dr. Conner for left foot pain.   Points to sinus tarsi and cuboidleft of left foot.  Has had this for a year but is getting worse over the last month.  He works as a .  His pain is aggravated by activity and relieved by rest.  He also has numbness on both feet.  The right is much worse.  He states he goes up his right leg laterally.  He also says he has some pain in his hip and he points area of the sciatic nerve.  This is his biggest complaint in his right ankle.  He does consume alcohol on a regular basis.    ROS:  A 10-point review of systems was performed and is positive for that noted in the HPI and as seen above.  All other areas are negative.        No Known Allergies    No current outpatient medications on file.       Patient Active Problem List   Diagnosis     Alcohol consumption binge drinking     Diverticulitis of colon     Elevated blood pressure, situational     Multiple closed fractures of ribs of right side       Past Medical History:   Diagnosis Date     Multiple rib fractures     x 4 on right side, hospitalized over night       Past Surgical History:   Procedure Laterality Date     COLECTOMY PARTIAL  2009    sigmoid colectomy due to acute diverticulitis without rupture     TONSILLECTOMY         Family History   Problem Relation Age of Onset     Other - See Comments Mother         unknown, parents  when pt was infant     Kidney Cancer Father         cause of death     Other - See Comments Son         no knowledge, gave up for adoption       Social History     Tobacco Use     Smoking status: Current Some Day Smoker     Smokeless tobacco: Never Used     Tobacco comment: social smoker    Substance Use Topics     Alcohol use: Yes     Frequency: 4 or more times a week     Drinks per session: 5 or 6     Comment: occ.         Exam:    Vitals: BP (!) 141/92 (BP Location: Right arm, Patient Position: Sitting, Cuff Size: Adult Large)   Pulse 87   Temp 98.1  F (36.7  C) (Oral)   SpO2 97%   BMI: There is no height or weight on file to calculate BMI.  Height: Data Unavailable    Constitutional/ general:  Pt is in no apparent distress, appears well-nourished.  Cooperative with history and physical exam.     Psych:  The patient answered questions appropriately.  Normal affect.  Seems to have reasonable expectations, in terms of treatment.     Eyes:  Visual scanning/ tracking without deficit.     Ears:  Response to auditory stimuli is normal.  negative hearing aid devices.  Auricles in proper alignment.     Lymphatic:  Popliteal lymph nodes not enlarged.     Lungs:  Non labored breathing, non labored speech. No cough.  No audible wheezing. Even, quiet breathing.       Vascular:  positive pedal pulses bilaterally for both the DP and PT arteries.  CFT < 3 sec.  negative ankle edema.  positive pedal hair growth.    Neuro:  Alert and oriented x 3. Coordinated gait.  Light touch sensation is intact to the L4, L5, S1 distributions, but patient feels numbness with light touch more so on the right foot especially lateral.  No ankle clonus.  Babinski normal.  No evidence of neurological-based weakness, spasticity, or contracture in the lower extremities.      Derm: Normal texture and turgor.  No erythema, ecchymosis, or cyanosis.      Musculoskeletal:    Lower extremity muscle strength is normal.  Patient is ambulatory without an assistive device or brace.   Pronated arch with weightbearing.  No forefoot or rear foot deformities noted.  Normal ROM all fore foot and rearfoot joints.  No equinus.    No pain with stressing any muscle compartments.  Left lateral foot and ankle there is significant edema here.  There is  pain from the lateral tarsometatarsal joint to the sinus tarsi.  There is slight erythema here.  There is no crepitus.  I can feel no obvious masses.  No calcaneal compression pain.    Radiographic Exam:  X-Ray Findings: Right x-rays unremarkable.  Left foot x-rays show bossing on the dorsum of the talonavicular joint but no appreciable arthritis here.  No gross ankle arthritis or any other bone findings to explain all the swelling.    Vitamin B12 302   193 - 986 pg/mL Final 08/01/2019  2:36 PM 51     Glucose  122    A:    Left lateral ankle pain and edema  Bilateral pronation  Bilateral numbness with the right worse than the left    P:  X-rays taken today both feet and left ankle explained to patient that I see no obvious musculoskeletal cause of the swelling and pain in his left lateral ankle.  We will send to the lab today to rule out inflammatory arthritis and will call patient with results.  We also discussed additional imaging to evaluate for stress reaction or stress fracture.  It is possible he could have a severe case of sinus tarsi syndrome however this would be quite unusual.  I reviewed patient's recent labs.  explained that right numbness could be from radiculopathy especially with his pain in his hips.   Patient's alcohol abuse could also be contributing to his nerve pathology.  For further evaluation of this we discussed an EMG and referral to her neurologist.  He is in agreement with this.  We placed a referral and he will set up a time to have this done..  We will call the patient with the lab results and decide and how to proceed for his left foot.  Thank you for allowing me participate in the care of this patient.        Sylvester Silveira DPM, FACFAS      Again, thank you for allowing me to participate in the care of your patient.        Sincerely,        Sylvester Silveira DPM

## 2019-08-19 LAB
ANA SER QL IF: NEGATIVE
B BURGDOR IGG+IGM SER QL: 0.05 (ref 0–0.89)
RHEUMATOID FACT SER NEPH-ACNC: <20 IU/ML (ref 0–20)
